# Patient Record
Sex: MALE | Race: WHITE | NOT HISPANIC OR LATINO | Employment: FULL TIME | URBAN - METROPOLITAN AREA
[De-identification: names, ages, dates, MRNs, and addresses within clinical notes are randomized per-mention and may not be internally consistent; named-entity substitution may affect disease eponyms.]

---

## 2024-03-15 ENCOUNTER — APPOINTMENT (OUTPATIENT)
Dept: CT IMAGING | Facility: CLINIC | Age: 64
End: 2024-03-15
Attending: FAMILY MEDICINE
Payer: COMMERCIAL

## 2024-03-15 ENCOUNTER — HOSPITAL ENCOUNTER (EMERGENCY)
Facility: CLINIC | Age: 64
Discharge: HOME | End: 2024-03-15
Attending: FAMILY MEDICINE | Admitting: FAMILY MEDICINE
Payer: COMMERCIAL

## 2024-03-15 ENCOUNTER — APPOINTMENT (OUTPATIENT)
Dept: MRI IMAGING | Facility: CLINIC | Age: 64
End: 2024-03-15
Payer: COMMERCIAL

## 2024-03-15 ENCOUNTER — APPOINTMENT (OUTPATIENT)
Dept: MRI IMAGING | Facility: CLINIC | Age: 64
End: 2024-03-15
Attending: FAMILY MEDICINE
Payer: COMMERCIAL

## 2024-03-15 VITALS
OXYGEN SATURATION: 99 % | DIASTOLIC BLOOD PRESSURE: 84 MMHG | WEIGHT: 162.8 LBS | TEMPERATURE: 99.1 F | HEIGHT: 69 IN | BODY MASS INDEX: 24.11 KG/M2 | RESPIRATION RATE: 15 BRPM | SYSTOLIC BLOOD PRESSURE: 138 MMHG | HEART RATE: 66 BPM

## 2024-03-15 DIAGNOSIS — R20.0 LEFT FACIAL NUMBNESS: ICD-10-CM

## 2024-03-15 LAB
ANION GAP SERPL CALCULATED.3IONS-SCNC: 11 MMOL/L (ref 7–15)
APTT PPP: 30 SECONDS (ref 22–38)
BASOPHILS # BLD AUTO: 0 10E3/UL (ref 0–0.2)
BASOPHILS NFR BLD AUTO: 0 %
BUN SERPL-MCNC: 19.3 MG/DL (ref 8–23)
CALCIUM SERPL-MCNC: 9.8 MG/DL (ref 8.8–10.2)
CHLORIDE SERPL-SCNC: 101 MMOL/L (ref 98–107)
CREAT SERPL-MCNC: 1.2 MG/DL (ref 0.67–1.17)
DEPRECATED HCO3 PLAS-SCNC: 28 MMOL/L (ref 22–29)
EGFRCR SERPLBLD CKD-EPI 2021: 69 ML/MIN/1.73M2
EOSINOPHIL # BLD AUTO: 0.2 10E3/UL (ref 0–0.7)
EOSINOPHIL NFR BLD AUTO: 4 %
ERYTHROCYTE [DISTWIDTH] IN BLOOD BY AUTOMATED COUNT: 11.9 % (ref 10–15)
GLUCOSE BLDC GLUCOMTR-MCNC: 90 MG/DL (ref 70–99)
GLUCOSE SERPL-MCNC: 93 MG/DL (ref 70–99)
HCT VFR BLD AUTO: 42.6 % (ref 40–53)
HGB BLD-MCNC: 14.6 G/DL (ref 13.3–17.7)
IMM GRANULOCYTES # BLD: 0 10E3/UL
IMM GRANULOCYTES NFR BLD: 0 %
INR PPP: 1.02 (ref 0.85–1.15)
LYMPHOCYTES # BLD AUTO: 1.2 10E3/UL (ref 0.8–5.3)
LYMPHOCYTES NFR BLD AUTO: 26 %
MCH RBC QN AUTO: 32.8 PG (ref 26.5–33)
MCHC RBC AUTO-ENTMCNC: 34.3 G/DL (ref 31.5–36.5)
MCV RBC AUTO: 96 FL (ref 78–100)
MONOCYTES # BLD AUTO: 0.4 10E3/UL (ref 0–1.3)
MONOCYTES NFR BLD AUTO: 10 %
NEUTROPHILS # BLD AUTO: 2.8 10E3/UL (ref 1.6–8.3)
NEUTROPHILS NFR BLD AUTO: 60 %
NRBC # BLD AUTO: 0 10E3/UL
NRBC BLD AUTO-RTO: 0 /100
PLATELET # BLD AUTO: 194 10E3/UL (ref 150–450)
POTASSIUM SERPL-SCNC: 4.1 MMOL/L (ref 3.4–5.3)
RBC # BLD AUTO: 4.45 10E6/UL (ref 4.4–5.9)
SODIUM SERPL-SCNC: 140 MMOL/L (ref 135–145)
TROPONIN T SERPL HS-MCNC: 7 NG/L
WBC # BLD AUTO: 4.6 10E3/UL (ref 4–11)

## 2024-03-15 PROCEDURE — 70450 CT HEAD/BRAIN W/O DYE: CPT

## 2024-03-15 PROCEDURE — 255N000002 HC RX 255 OP 636: Performed by: FAMILY MEDICINE

## 2024-03-15 PROCEDURE — 99207 PR NO BILLABLE SERVICE THIS VISIT: CPT

## 2024-03-15 PROCEDURE — 85730 THROMBOPLASTIN TIME PARTIAL: CPT | Performed by: FAMILY MEDICINE

## 2024-03-15 PROCEDURE — 70549 MR ANGIOGRAPH NECK W/O&W/DYE: CPT

## 2024-03-15 PROCEDURE — 85004 AUTOMATED DIFF WBC COUNT: CPT | Performed by: FAMILY MEDICINE

## 2024-03-15 PROCEDURE — 99285 EMERGENCY DEPT VISIT HI MDM: CPT | Mod: 25 | Performed by: FAMILY MEDICINE

## 2024-03-15 PROCEDURE — 70553 MRI BRAIN STEM W/O & W/DYE: CPT

## 2024-03-15 PROCEDURE — A9585 GADOBUTROL INJECTION: HCPCS | Performed by: FAMILY MEDICINE

## 2024-03-15 PROCEDURE — 82962 GLUCOSE BLOOD TEST: CPT

## 2024-03-15 PROCEDURE — 84484 ASSAY OF TROPONIN QUANT: CPT | Performed by: FAMILY MEDICINE

## 2024-03-15 PROCEDURE — 70544 MR ANGIOGRAPHY HEAD W/O DYE: CPT | Mod: XU

## 2024-03-15 PROCEDURE — 85610 PROTHROMBIN TIME: CPT | Performed by: FAMILY MEDICINE

## 2024-03-15 PROCEDURE — 250N000013 HC RX MED GY IP 250 OP 250 PS 637: Performed by: FAMILY MEDICINE

## 2024-03-15 PROCEDURE — 82565 ASSAY OF CREATININE: CPT | Performed by: FAMILY MEDICINE

## 2024-03-15 PROCEDURE — 93010 ELECTROCARDIOGRAM REPORT: CPT | Performed by: FAMILY MEDICINE

## 2024-03-15 PROCEDURE — 93005 ELECTROCARDIOGRAM TRACING: CPT | Performed by: FAMILY MEDICINE

## 2024-03-15 PROCEDURE — 36415 COLL VENOUS BLD VENIPUNCTURE: CPT | Performed by: FAMILY MEDICINE

## 2024-03-15 RX ORDER — ASPIRIN 81 MG/1
81 TABLET ORAL DAILY
Qty: 30 TABLET | Refills: 0 | Status: SHIPPED | OUTPATIENT
Start: 2024-03-15 | End: 2024-04-14

## 2024-03-15 RX ORDER — ROSUVASTATIN CALCIUM 10 MG/1
10 TABLET, COATED ORAL DAILY
COMMUNITY
Start: 2024-02-28

## 2024-03-15 RX ORDER — GADOBUTROL 604.72 MG/ML
7.5 INJECTION INTRAVENOUS ONCE
Status: COMPLETED | OUTPATIENT
Start: 2024-03-15 | End: 2024-03-15

## 2024-03-15 RX ORDER — LANOLIN ALCOHOL/MO/W.PET/CERES
1000 CREAM (GRAM) TOPICAL DAILY
COMMUNITY

## 2024-03-15 RX ORDER — ASPIRIN 81 MG/1
325 TABLET, CHEWABLE ORAL ONCE
Status: COMPLETED | OUTPATIENT
Start: 2024-03-15 | End: 2024-03-15

## 2024-03-15 RX ADMIN — GADOBUTROL 7.5 ML: 604.72 INJECTION INTRAVENOUS at 18:35

## 2024-03-15 RX ADMIN — ASPIRIN 81 MG CHEWABLE TABLET 324 MG: 81 TABLET CHEWABLE at 20:36

## 2024-03-15 ASSESSMENT — ACTIVITIES OF DAILY LIVING (ADL)
ADLS_ACUITY_SCORE: 35

## 2024-03-15 ASSESSMENT — COLUMBIA-SUICIDE SEVERITY RATING SCALE - C-SSRS
6. HAVE YOU EVER DONE ANYTHING, STARTED TO DO ANYTHING, OR PREPARED TO DO ANYTHING TO END YOUR LIFE?: NO
1. IN THE PAST MONTH, HAVE YOU WISHED YOU WERE DEAD OR WISHED YOU COULD GO TO SLEEP AND NOT WAKE UP?: NO
2. HAVE YOU ACTUALLY HAD ANY THOUGHTS OF KILLING YOURSELF IN THE PAST MONTH?: NO

## 2024-03-15 NOTE — ED TRIAGE NOTES
States at 1130 today had left face numbness, left arm fell asleep, and left eye vision was off.      Triage Assessment (Adult)       Row Name 03/15/24 1510          Triage Assessment    Airway WDL WDL        Respiratory WDL    Respiratory WDL WDL        Skin Circulation/Temperature WDL    Skin Circulation/Temperature WDL WDL        Cardiac WDL    Cardiac WDL WDL        Cognitive/Neuro/Behavioral WDL    Cognitive/Neuro/Behavioral WDL WDL                     
No

## 2024-03-15 NOTE — ED PROVIDER NOTES
[facility] ED Provider Note   Patient: Ousmane Wheeler  MRN #:  [id number]  Date of Visit: March 15, 2024    CC:     Chief Complaint   Patient presents with    Numbness     HPI:  Ousmane Wheeler is a 63 year old male history of hyperlipidemia who presented to the emergency department with transient symptoms of left facial numbness, and left upper arm numbness that occurred at 1130 this morning.  Patient was at work when symptoms came on.  He states he was chewing on a carrot when he suddenly felt the left-sided facial numbness.  He did not have any facial droop, speech difficulty, or double swallowing.  This was preceded by some vague left eye symptoms but not loss of vision or double vision.  Patient at the same time also had some left upper arm numbness like it had fallen asleep.  His symptoms lasted 5 minutes and then resolved.  He was at work and did not come in sooner.  He went to urgent care after work and then was directed to come to the ED.  Patient has no current symptoms, although he reported that earlier this week after he went for a run, he had brief episode of left leg numbness.  Patient has never had anything like this before.  He does not have headache, vision loss, facial droop, speech difficulty, dysphagia, chest pain, shortness of breath, nausea, vomiting, or unilateral weakness.  He is right-hand dominant.  He has had high cholesterol levels for the past 2 years, recently started on Lipitor but developed some pain in his knuckles and discontinued medications.  No personal history of diabetes, hypertension or tobacco use.  No previous strokes or heart attacks.  Patient only takes vitamin D and B complex vitamins.    Problem List:  There are no problems to display for this patient.      No past medical history on file.    MEDS: No current outpatient medications on file.      ALLERGIES:  No Known Allergies    No past surgical history  "on file.    Social History     Tobacco Use    Smoking status: Never         Review of Systems   Except as noted in HPI, all other systems were reviewed and are negative    Physical Exam   Vitals were reviewed  Patient Vitals for the past 12 hrs:   BP Temp Temp src Pulse Resp SpO2 Height Weight   03/15/24 1703 129/79 -- -- 60 15 97 % -- --   03/15/24 1648 -- -- -- 60 10 97 % -- --   03/15/24 1633 -- -- -- 60 -- 98 % -- --   03/15/24 1632 -- -- -- 60 13 97 % -- --   03/15/24 1618 -- -- -- 64 -- 99 % -- --   03/15/24 1617 -- -- -- 62 -- 99 % -- --   03/15/24 1603 -- -- -- 65 -- 98 % -- --   03/15/24 1602 (!) 155/116 -- -- 66 -- 98 % -- --   03/15/24 1548 -- -- -- 63 (!) 9 98 % -- --   03/15/24 1547 (!) 150/82 -- -- 63 -- 97 % -- --   03/15/24 1533 -- -- -- 68 16 98 % -- --   03/15/24 1532 (!) 159/95 -- -- 69 16 98 % -- --   03/15/24 1517 (!) 179/108 -- -- -- -- -- -- --   03/15/24 1511 (!) 179/108 99.1  F (37.3  C) Oral 73 20 99 % 1.753 m (5' 9\") 73.8 kg (162 lb 12.8 oz)     GENERAL APPEARANCE: Alert and oriented x 3, no acute distress  FACE: normal facies: No asymmetry  EYES: Pupils are equal and reactive to light, extraocular muscles are intact  HENT: normal external exam; tongue protrudes in the midline  NECK: no adenopathy or asymmetry; supple, no carotid bruits  RESP: normal respiratory effort; clear breath sounds bilaterally  CV: regular rate and rhythm; no significant murmurs, gallops or rubs  ABD: soft, no tenderness; no rebound or guarding; bowel sounds are normal  MS: no gross deformities noted; normal muscle tone.  EXT: No calf tenderness or pitting edema  SKIN: no worrisome rash  NEURO: no facial droop; no focal deficits, speech is normal; strength is intact in the upper and lower extremities.  Normal sensation to touch in both upper and lower extremities.  PSYCH: normal mood and affect    [employer] Stroke Scale  Exam Interval: Baseline   Score    Level of consciousness: (0)   Alert, " keenly responsive    LOC questions: (0)   Answers both questions correctly    LOC commands: (0)   Performs both tasks correctly    Best gaze: (0)   Normal    Visual: (0)   No visual loss    Facial palsy: (0)   Normal symmetrical movements    Motor arm (left): (0)   No drift    Motor arm (right): (0)   No drift    Motor leg (left): (0)   No drift    Motor leg (right): (0)   No drift    Limb ataxia: (0)   Absent    Sensory: (0)   Normal- no sensory loss    Best language: (0)   Normal- no aphasia    Dysarthria: (0)   Normal    Extinction and inattention: (0)   No abnormality        Total Score:  0         Available Lab/Imaging Results     Results for orders placed or performed during the hospital encounter of 03/15/24 (from the past 24 hour(s))   Glucose by meter   Result Value Ref Range    GLUCOSE BY METER POCT 90 70 - 99 mg/dL   CBC with Platelets & Differential    Narrative    The following orders were created for panel order CBC with Platelets & Differential.  Procedure                               Abnormality         Status                     ---------                               -----------         ------                     CBC with platelets and d...[[id number]]                      Final result                 Please view results for these tests on the individual orders.   Basic metabolic panel   Result Value Ref Range    Sodium 140 135 - 145 mmol/L    Potassium 4.1 3.4 - 5.3 mmol/L    Chloride 101 98 - 107 mmol/L    Carbon Dioxide (CO2) 28 22 - 29 mmol/L    Anion Gap 11 7 - 15 mmol/L    Urea Nitrogen 19.3 8.0 - 23.0 mg/dL    Creatinine 1.20 (H) 0.67 - 1.17 mg/dL    GFR Estimate 69 >60 mL/min/1.73m2    Calcium 9.8 8.8 - 10.2 mg/dL    Glucose 93 70 - 99 mg/dL   INR   Result Value Ref Range    INR 1.02 0.85 - 1.15   Partial thromboplastin time   Result Value Ref Range    aPTT 30 22 - 38 Seconds   Troponin T, High Sensitivity   Result Value Ref Range    Troponin T, High Sensitivity 7 <=22 ng/L   CBC with  platelets and differential   Result Value Ref Range    WBC Count 4.6 4.0 - 11.0 10e3/uL    RBC Count 4.45 4.40 - 5.90 10e6/uL    Hemoglobin 14.6 13.3 - 17.7 g/dL    Hematocrit 42.6 40.0 - 53.0 %    MCV 96 78 - 100 fL    MCH 32.8 26.5 - 33.0 pg    MCHC 34.3 31.5 - 36.5 g/dL    RDW 11.9 10.0 - 15.0 %    Platelet Count 194 150 - 450 10e3/uL    % Neutrophils 60 %    % Lymphocytes 26 %    % Monocytes 10 %    % Eosinophils 4 %    % Basophils 0 %    % Immature Granulocytes 0 %    NRBCs per 100 WBC 0 <1 /100    Absolute Neutrophils 2.8 1.6 - 8.3 10e3/uL    Absolute Lymphocytes 1.2 0.8 - 5.3 10e3/uL    Absolute Monocytes 0.4 0.0 - 1.3 10e3/uL    Absolute Eosinophils 0.2 0.0 - 0.7 10e3/uL    Absolute Basophils 0.0 0.0 - 0.2 10e3/uL    Absolute Immature Granulocytes 0.0 <=0.4 10e3/uL    Absolute NRBCs 0.0 10e3/uL   CT Head w/o Contrast    Narrative    CT SCAN OF THE HEAD WITHOUT CONTRAST   3/15/2024 3:26 PM     HISTORY: Acute neuro deficit, stroke/TIA suspected    TECHNIQUE:  Axial images of the head and coronal reformations without  IV contrast material. Radiation dose for this scan was reduced using  automated exposure control, adjustment of the mA and/or kV according  to patient size, or iterative reconstruction technique.    COMPARISON: Head CT 3/25/2016    FINDINGS:  No evidence of hemorrhage, mass, or hydrocephalus. Mild generalized  volume loss with background of nonspecific white matter  hypoattenuation likely representing chronic small vessel ischemic  change. No acute osseous abnormality.      Impression    IMPRESSION:   No acute intracranial abnormality.    LETI OSBORN MD         SYSTEM ID:  [id number]     EKG reviewed by me: Sinus rhythm with heart rate of 68.  IL interval of 183 ms, QT interval of 440 ms, cures duration of 162 ms.  Right bundle branch block.  No acute ST-T wave changes.  No previous EKGs for comparison.      Impression     Final diagnoses:   Left facial numbness         ED Course & Medical  Decision Making   Ousmane Wheeler is a 63 year old male with history of hyperlipidemia who presented to the emergency department with a brief episode of left facial and left upper arm numbness that started at 11:30 AM.  Prior to this patient reported some vague left eye symptoms, with it not for feeling normal.  He had no vision loss or double vision.  Patient had an episode of left leg numbness earlier this week after he went for a run.  Patient states that he was at work when his left facial numbness and arm numbness started.  He went to urgent care initially, and then was sent to the ED.  His episode occurred about 3-1/2 hours ago.  Patient reports no current symptoms.  He denies any personal history of diabetes, hypertension, or tobacco use.  He had taken Lipitor briefly but because of side effects and concerns for other side effects of other cholesterol-lowering medications discontinued these medications.    Patient was seen shortly after arrival.  An urgent code stroke was called.  His formal NIH score is 0.  Patient was sent for initial noncontrast CT scan of the head.    3:25 PM: I talked with the stroke neurology team.  Given that his symptoms have resolved and he has an NIH score of 0, proceed with MR imaging of brain and neck.    Patient is awaiting completion of his MR brain with and without contrast.      7:00 PM: Patient was signed out to Dr. Dash Stearns at the change of shift.         Disclaimer: This note consists of words and symbols derived from keyboarding and dictation using voice recognition software.  As a result, there may be errors that have gone undetected.  Please consider this when interpreting information found in this note.       Robert Gillette MD  03/15/24 2029

## 2024-03-15 NOTE — CONSULTS
"  [facility]    Stroke Telephone Note    I was called by Dr. Robert Gillette on 03/15/24 regarding patient Ousmane Wheeler. The patient is a 63 year old male PMH of hairy cell leukemia, megaloblastic anemia, ?HTN. History obtained from ED provider. Today, at 11:30 AM Ousmane developed left facial numbness while biting down/chewing on a carrot. He also developed left upper arm numbness. These symptoms lasted 5-10 minutes and then resolved. Ousmane also reported an episode of left leg numbness, earlier this week, after going on a run. On ED provider's examination, no focal neurologic deficits appreciated, NIHSS 0.      Stroke code/urgent consult was called and then changed to a STAT consult.     Vitals  BP: (!) 179/108   Pulse: 73   Resp: 20   Temp: 99.1  F (37.3  C)   Weight: 73.8 kg (162 lb 12.8 oz) (actual)    Imaging Findings  CTH (obtained my primary team): No evidence of hemorrhage.  MRI Brain with and without contrast: pending   MRA Brain without contrast: pending  MRA Neck with and without contrast: pending     Impression  Transient episode of left facial numbness and left upper arm numbness, unclear etiology. Will obtain MRI brain and MRAs to evaluate for acute stroke.     Recommendations  - brain MRI with and without contrast; please page stroke neurology if infarct is identified for further recommendations  - MRA Brain without contrast and MRA Neck with and without contrast (ordered)    Case discussed with vascular neurology attending Dr. Ryder Slaughter.    My recommendations are based on the information provided over the phone by Ousmane Pattonric's in-person providers. They are not intended to replace the clinical judgment of his in-person providers. I was not requested to personally see or examine the patient at this time.     Cori Pryor PA-C  Vascular Neurology    To page me or covering stroke neurology team member, click here: [facility]  Choose \"On Call\" tab at top, " "then select \"NEUROLOGY/ALL SITES\" from middle drop-down box, press Enter, then look for \"stroke\" or \"telestroke\" for your site.   "

## 2024-03-15 NOTE — ED NOTES
Bed: ED01  Expected date:   Expected time:   Means of arrival:   Comments:  Bed 6   Estimated Blood Loss (Cc): minimal

## 2024-03-16 NOTE — DISCHARGE INSTRUCTIONS
1.  Please follow-up with your doctor this coming week to have a tulio discussion about getting on some type of cholesterol medication and discussing risk benefits about this.  2.  Someone should be contacting you on Monday to get an appointment set up for neurology.  3.  Start on a baby aspirin daily starting tomorrow.

## 2024-03-16 NOTE — MEDICATION SCRIBE - ADMISSION MEDICATION HISTORY
Medication Scribe Admission Medication History    Admission medication history is complete. The information provided in this note is only as accurate as the sources available at the time of the update.    Information Source(s): Patient via in-person    Pertinent Information:     Changes made to PTA medication list:  Added: PTA medication list added from reconcile list, confirmed by patient   Deleted: None  Changed: None    Allergies reviewed with patient and updates made in EHR: yes    Medication History Completed By: RORY SANON 3/15/2024 7:21 PM    PTA Med List   Medication Sig Last Dose    cholecalciferol (VITAMIN D3) 25 mcg (1000 units) capsule Take 25 mcg by mouth daily 1,000 iul 3/15/2024 at am    cyanocobalamin (VITAMIN B-12) 1000 MCG tablet Take 1,000 mcg by mouth daily 3/15/2024 at am

## 2024-03-16 NOTE — ED PROVIDER NOTES
Transfer of Care Note  This patient was signed out to me and I assumed care from Dr. Gillette at change of shift.  Ousmane Wheeler is a 63 year old male who presented to the emergency department with a chief complaint of Numbness  .  Please see the original providers history and physical for complete details.    The following issues were signed out to me to follow up on:  Patient was signed out to me to follow-up on the MRI.  Briefly I was told that this is a 63-year-old male who came in after a 5 to 10-minute episode of left facial and left arm numbness that lasted about 10 minutes.  Patient was asymptomatic since being here.   CT scan of the head was done and was negative.  Stroke neuro was consulted and they recommended getting a MRI of the brain and also an MRI of the head and neck vessels.      Pertant Lab/Imaging Findings During this Visit was     Results for orders placed or performed during the hospital encounter of 03/15/24 (from the past 24 hour(s))   Glucose by meter   Result Value Ref Range    GLUCOSE BY METER POCT 90 70 - 99 mg/dL   CBC with Platelets & Differential    Narrative    The following orders were created for panel order CBC with Platelets & Differential.  Procedure                               Abnormality         Status                     ---------                               -----------         ------                     CBC with platelets and d...[[id number]]                      Final result                 Please view results for these tests on the individual orders.   Basic metabolic panel   Result Value Ref Range    Sodium 140 135 - 145 mmol/L    Potassium 4.1 3.4 - 5.3 mmol/L    Chloride 101 98 - 107 mmol/L    Carbon Dioxide (CO2) 28 22 - 29 mmol/L    Anion Gap 11 7 - 15 mmol/L    Urea Nitrogen 19.3 8.0 - 23.0 mg/dL    Creatinine 1.20 (H) 0.67 - 1.17 mg/dL    GFR Estimate 69 >60 mL/min/1.73m2    Calcium 9.8 8.8 - 10.2 mg/dL    Glucose 93 70 - 99 mg/dL   INR   Result Value Ref Range     INR 1.02 0.85 - 1.15   Partial thromboplastin time   Result Value Ref Range    aPTT 30 22 - 38 Seconds   Troponin T, High Sensitivity   Result Value Ref Range    Troponin T, High Sensitivity 7 <=22 ng/L   CBC with platelets and differential   Result Value Ref Range    WBC Count 4.6 4.0 - 11.0 10e3/uL    RBC Count 4.45 4.40 - 5.90 10e6/uL    Hemoglobin 14.6 13.3 - 17.7 g/dL    Hematocrit 42.6 40.0 - 53.0 %    MCV 96 78 - 100 fL    MCH 32.8 26.5 - 33.0 pg    MCHC 34.3 31.5 - 36.5 g/dL    RDW 11.9 10.0 - 15.0 %    Platelet Count 194 150 - 450 10e3/uL    % Neutrophils 60 %    % Lymphocytes 26 %    % Monocytes 10 %    % Eosinophils 4 %    % Basophils 0 %    % Immature Granulocytes 0 %    NRBCs per 100 WBC 0 <1 /100    Absolute Neutrophils 2.8 1.6 - 8.3 10e3/uL    Absolute Lymphocytes 1.2 0.8 - 5.3 10e3/uL    Absolute Monocytes 0.4 0.0 - 1.3 10e3/uL    Absolute Eosinophils 0.2 0.0 - 0.7 10e3/uL    Absolute Basophils 0.0 0.0 - 0.2 10e3/uL    Absolute Immature Granulocytes 0.0 <=0.4 10e3/uL    Absolute NRBCs 0.0 10e3/uL   CT Head w/o Contrast    Narrative    CT SCAN OF THE HEAD WITHOUT CONTRAST   3/15/2024 3:26 PM     HISTORY: Acute neuro deficit, stroke/TIA suspected    TECHNIQUE:  Axial images of the head and coronal reformations without  IV contrast material. Radiation dose for this scan was reduced using  automated exposure control, adjustment of the mA and/or kV according  to patient size, or iterative reconstruction technique.    COMPARISON: Head CT 3/25/2016    FINDINGS:  No evidence of hemorrhage, mass, or hydrocephalus. Mild generalized  volume loss with background of nonspecific white matter  hypoattenuation likely representing chronic small vessel ischemic  change. No acute osseous abnormality.      Impression    IMPRESSION:   No acute intracranial abnormality.    LETI OSBORN MD         SYSTEM ID:  [id number]   MR Brain w/o & w Contrast    Narrative    EXAM: MRA BRAIN ([place] OF STONE) W/O CONTRAST, MRA NECK  (CAROTIDS) W/O and W CONTRAST, MR BRAIN W/O and W CONTRAST  LOCATION: [facility]  DATE: 3/15/2024    INDICATION: Transient left face and upper arm numbness.  COMPARISON: MRI dated 12/27/2021. No prior angiography is available for comparison.  CONTRAST: 7.5mL Gadavist  TECHNIQUE:   1) Routine multiplanar multisequence head MRI without and with intravenous contrast.  2) 3D time-of-flight head MRA without intravenous contrast.  3) Neck MRA without and with IV contrast. Stenosis measurements made according to NASCET criteria unless otherwise specified.    FINDINGS:  HEAD MRI:  INTRACRANIAL CONTENTS: No acute/subacute infarct, acute hemorrhage, or extra-axial fluid collection. No intracranial mass or abnormal enhancement. Several tiny foci of T2 prolongation within the bilateral cerebral white matter, nonspecific although most   likely the sequela of minimal chronic microvascular ischemia. Normal ventricles and sulci for age. Normal position of the cerebellar tonsils.     SELLA: Within technique limitations, no gross abnormality.    OSSEOUS STRUCTURES/SOFT TISSUES: Normal marrow signal. The major intracranial vascular flow voids are maintained.     ORBITS: Within technique limitations, no significant abnormality.     SINUSES/MASTOIDS: No significant paranasal sinus or mastoid mucosal disease.      HEAD MRA:   ANTERIOR CIRCULATION: No high-grade stenosis, occlusion, aneurysm, or high flow vascular malformation. Fetal continuation of the left posterior cerebral artery.    POSTERIOR CIRCULATION: Moderate short-segment stenosis involving the P2 segment of the right posterior cerebral artery. Otherwise, no high-grade stenosis, occlusion, aneurysm, or high flow vascular malformation. Balanced vertebral arteries.     NECK MRA:   RIGHT CAROTID: No hemodynamically-significant stenosis or dissection.     LEFT CAROTID: No hemodynamically-significant stenosis or dissection. Mild irregularity at the  carotid bifurcation and ICA origin/proximal segment, most compatible with nonflow-limiting atherosclerotic plaque.    VERTEBRAL ARTERIES: No focal high-grade stenosis or dissection. Balanced vertebral arteries.    AORTIC ARCH: Classic aortic arch anatomy with no significant stenosis at the origin of the great vessels.      Impression    IMPRESSION:  HEAD MRI:   1.  No acute intracranial abnormality.    HEAD MRA:   1.  Moderate short-segment stenosis involving the P2 segment of the right PCA.  2.  No large vessel occlusion, high-grade anterior circulation stenosis, aneurysm, or high-flow vascular malformation.    NECK MRA:  1.  Unremarkable neck MRA.   MRA Neck (Carotids) wo & w Contrast    Narrative    EXAM: MRA BRAIN ([place] OF STONE) W/O CONTRAST, MRA NECK (CAROTIDS) W/O and W CONTRAST, MR BRAIN W/O and W CONTRAST  LOCATION: [facility]  DATE: 3/15/2024    INDICATION: Transient left face and upper arm numbness.  COMPARISON: MRI dated 12/27/2021. No prior angiography is available for comparison.  CONTRAST: 7.5mL Gadavist  TECHNIQUE:   1) Routine multiplanar multisequence head MRI without and with intravenous contrast.  2) 3D time-of-flight head MRA without intravenous contrast.  3) Neck MRA without and with IV contrast. Stenosis measurements made according to NASCET criteria unless otherwise specified.    FINDINGS:  HEAD MRI:  INTRACRANIAL CONTENTS: No acute/subacute infarct, acute hemorrhage, or extra-axial fluid collection. No intracranial mass or abnormal enhancement. Several tiny foci of T2 prolongation within the bilateral cerebral white matter, nonspecific although most   likely the sequela of minimal chronic microvascular ischemia. Normal ventricles and sulci for age. Normal position of the cerebellar tonsils.     SELLA: Within technique limitations, no gross abnormality.    OSSEOUS STRUCTURES/SOFT TISSUES: Normal marrow signal. The major intracranial vascular flow voids are  maintained.     ORBITS: Within technique limitations, no significant abnormality.     SINUSES/MASTOIDS: No significant paranasal sinus or mastoid mucosal disease.      HEAD MRA:   ANTERIOR CIRCULATION: No high-grade stenosis, occlusion, aneurysm, or high flow vascular malformation. Fetal continuation of the left posterior cerebral artery.    POSTERIOR CIRCULATION: Moderate short-segment stenosis involving the P2 segment of the right posterior cerebral artery. Otherwise, no high-grade stenosis, occlusion, aneurysm, or high flow vascular malformation. Balanced vertebral arteries.     NECK MRA:   RIGHT CAROTID: No hemodynamically-significant stenosis or dissection.     LEFT CAROTID: No hemodynamically-significant stenosis or dissection. Mild irregularity at the carotid bifurcation and ICA origin/proximal segment, most compatible with nonflow-limiting atherosclerotic plaque.    VERTEBRAL ARTERIES: No focal high-grade stenosis or dissection. Balanced vertebral arteries.    AORTIC ARCH: Classic aortic arch anatomy with no significant stenosis at the origin of the great vessels.      Impression    IMPRESSION:  HEAD MRI:   1.  No acute intracranial abnormality.    HEAD MRA:   1.  Moderate short-segment stenosis involving the P2 segment of the right PCA.  2.  No large vessel occlusion, high-grade anterior circulation stenosis, aneurysm, or high-flow vascular malformation.    NECK MRA:  1.  Unremarkable neck MRA.   MRA Brain ([place]) wo Contrast    Narrative    EXAM: MRA BRAIN ([place] OF [place]) W/O CONTRAST, MRA NECK (CAROTIDS) W/O and W CONTRAST, MR BRAIN W/O and W CONTRAST  LOCATION: [facility]  DATE: 3/15/2024    INDICATION: Transient left face and upper arm numbness.  COMPARISON: MRI dated 12/27/2021. No prior angiography is available for comparison.  CONTRAST: 7.5mL Gadavist  TECHNIQUE:   1) Routine multiplanar multisequence head MRI without and with intravenous contrast.  2) 3D  time-of-flight head MRA without intravenous contrast.  3) Neck MRA without and with IV contrast. Stenosis measurements made according to NASCET criteria unless otherwise specified.    FINDINGS:  HEAD MRI:  INTRACRANIAL CONTENTS: No acute/subacute infarct, acute hemorrhage, or extra-axial fluid collection. No intracranial mass or abnormal enhancement. Several tiny foci of T2 prolongation within the bilateral cerebral white matter, nonspecific although most   likely the sequela of minimal chronic microvascular ischemia. Normal ventricles and sulci for age. Normal position of the cerebellar tonsils.     SELLA: Within technique limitations, no gross abnormality.    OSSEOUS STRUCTURES/SOFT TISSUES: Normal marrow signal. The major intracranial vascular flow voids are maintained.     ORBITS: Within technique limitations, no significant abnormality.     SINUSES/MASTOIDS: No significant paranasal sinus or mastoid mucosal disease.      HEAD MRA:   ANTERIOR CIRCULATION: No high-grade stenosis, occlusion, aneurysm, or high flow vascular malformation. Fetal continuation of the left posterior cerebral artery.    POSTERIOR CIRCULATION: Moderate short-segment stenosis involving the P2 segment of the right posterior cerebral artery. Otherwise, no high-grade stenosis, occlusion, aneurysm, or high flow vascular malformation. Balanced vertebral arteries.     NECK MRA:   RIGHT CAROTID: No hemodynamically-significant stenosis or dissection.     LEFT CAROTID: No hemodynamically-significant stenosis or dissection. Mild irregularity at the carotid bifurcation and ICA origin/proximal segment, most compatible with nonflow-limiting atherosclerotic plaque.    VERTEBRAL ARTERIES: No focal high-grade stenosis or dissection. Balanced vertebral arteries.    AORTIC ARCH: Classic aortic arch anatomy with no significant stenosis at the origin of the great vessels.      Impression    IMPRESSION:  HEAD MRI:   1.  No acute intracranial  abnormality.    HEAD MRA:   1.  Moderate short-segment stenosis involving the P2 segment of the right PCA.  2.  No large vessel occlusion, high-grade anterior circulation stenosis, aneurysm, or high-flow vascular malformation.    NECK MRA:  1.  Unremarkable neck MRA.         My focused follow up physical exam shows:   Vitals:  B/P: 129/79, T: 99.1, P: 60, R: 15  Gen:  Pt appears stable and no apparent distress      ED Course & Medical Decision Making:  MRI came back and did not show any acute abnormality.  MRI did show a moderate short segment stenosis of the P2 segment of the right PCA, no other vascular findings or occlusions were noted.  I will go ahead and consult stroke neurology to see if there is any recommendations they have with the stenosis as this could be in the area of the patient's symptoms.  I did speak with stroke neurology and they are recommending discharge home.  They recommend starting the patient on a baby aspirin a day.  Patient was given a load here in the emergency department.  They also recommend increasing or changing the patient's Crestor.  When I went in to talk to the patient, he states that he is not taking the Crestor, he stopped it 2 weeks ago.  His doctor tried him on another medication but he did not want to take it because he did not like the side effect profile.  Patient is very concerned about taking anything for his cholesterol and just wants to do diet and exercise.  I had a long discussion with him about risk factors and trying to minimize these especially with seeing the stenosis in one of his cerebral segments.  Patient still would like to hold off on any cholesterol medicine.  I recommend that he get into see his doctor this coming week and have a tulio discussion about risk-benefit and side effect profiles and trying to find something that he can agree to take to help with his cholesterol.  He is agreeing to do the baby aspirin for now.  Neurology is also recommending a  referral to general neurology which was placed.  Patient will be discharged at this time.         Impression and Disposition:  Numbness           This note was completed in part using Dragon voice recognition, and may contain word and grammatical errors.        Dash Burr MD  03/15/24 2040

## 2024-03-16 NOTE — CONSULTS
"  [facility]    Stroke Telephone Note    I was called by Dash Burr on 03/15/24 regarding patient Ousmane Wheeler. The patient is a 63 year old male with past medical history of hairy cell leukemia, hyperlipidemia came in bed 5 with episode of left face numbness and left upper arm numbness while chewing on it..  MRI brain did not show any acute stroke.  MRA head and neck showed right P2 PCA moderate stenosis.  Given that the stenosis is moderate and distal low suspicion that this is a sensation of symptoms that occurred today.  Presentation is unlikely to be a TIA.  However given the fact that he has intracranial stenosis, risk factor optimization can help in stroke prevention    Plan  Start on aspirin 325 mg daily  Repeat lipid profile and HbA1c .  Adjust statin dose based on lipid profile  Follow-up in outpatient neurology clinic  - Statin for goal LDL 40-70  - Long term BP goal <135/80 to be achieved as outpatient within several weeks  - Better control of DM2 needed, goal A1C <7.0  - Mediterranean diet and daily exercise    The Stroke Staff is Dr. Slaughter.    Helena Carvajal MD  Vascular Neurology Fellow    To page me or covering stroke neurology team member, click here: [facility]  Choose \"On Call\" tab at top, then select \"NEUROLOGY/ALL SITES\" from middle drop-down box, press Enter, then look for \"stroke\" or \"telestroke\" for your site.        "

## 2024-05-02 ENCOUNTER — TELEPHONE (OUTPATIENT)
Dept: NEUROLOGY | Facility: CLINIC | Age: 64
End: 2024-05-02
Payer: COMMERCIAL

## 2024-05-02 NOTE — TELEPHONE ENCOUNTER
Left message with appointment time and date as well as well as phone number if appt needs to be changed

## 2024-05-02 NOTE — TELEPHONE ENCOUNTER
NEUROLOGY PRE- VISIT      RECORDS RECEIVED FROM: Dash Burr MD   REASON FOR VISIT: Left facial numbness   PROVIDER: Angel   DATE OF APPT: 05/03/2024   NOTES (FOR ALL VISITS) STATUS DETAILS   OFFICE NOTE from referring provider Internal Referral in chart   OFFICE NOTE from other specialist N/A    DISCHARGE SUMMARY from hospital N/A    DISCHARGE REPORT from ER Internal ED visit 03/15/2024   EEG N/A    EMG REPORT N/A         IMAGING  (FOR ALL VISITS)     MRI (HEAD, NECK, SPINE) Internal MRA brain/Neck- 03/15/2024   CT (HEAD, NECK, SPINE) Internal CT Head 03/15/2024

## 2024-05-02 NOTE — PROGRESS NOTES
INITIAL NEUROLOGY CONSULTATION    DATE OF VISIT: 5/3/2024  CLINIC LOCATION: [facility]  MRN: [id number]  PATIENT NAME: Ousmane Wheeler  YOB: 1960    REASON FOR VISIT: No chief complaint on file.    HISTORY OF PRESENT ILLNESS:                                                    Mr. Ousmane Wheeler is 63 year old right handed male patient with past medical history of megaloblastic anemia, hypertension, and leukemia, who was seen today for transient episode of left facial numbness.    Per patient's report, on 3/15/2024 the patient experienced left facial numbness while biting down/chewing on carrot that was also accompanied by his left upper arm numbness.  Symptoms lasted 5 to 10 minutes and then spontaneously resolved.  Was seen at [facility].    Head CT from 3/15/2024 was negative for acute intracranial pathology.  Brain MRI with and without contrast was negative for acute intracranial abnormality.  Head MRA demonstrated moderate short segment stenosis involving P2 segment of the right PCA.  Neck MRA was unremarkable.  Images were personally reviewed and independently interpreted.    The case was discussed over the phone with stroke neurology team.  The patient felt unlikely to have TIA, but optimization of stroke prevention measures was advised.  It was recommended to start aspirin and optimize hyperlipidemia.    Today, the patient reports ***.  He denies interval development of new focal neurological symptoms.    Laboratory evaluation from March 2024 demonstrated elevated creatinine of 1.2 on BMP, normal CBC, PTT and INR.    According to Care Everywhere, LDL was 143 in December 2023.  I do not see previous hemoglobin A1C.  PAST MEDICAL/SURGICAL HISTORY:                                                    I personally reviewed patient's past medical and surgical history with the patient at today's visit.  MEDICATIONS:                                                     I personally reviewed patient's medications and allergies with the patient at today's visit.  ALLERGIES:                                                    No Known Allergies  EXAM:                                                    VITAL SIGNS:   There were no vitals taken for this visit.  Mini-Cog Assessment:       General: pt is in NAD, cooperative.  Skin: normal turgor, moist mucous membranes, no lesions/rashes noticed.  HEENT: ATNC, EOMI, PERRL, white sclera, normal conjunctiva, no nystagmus or ptosis. No carotid bruits bilaterally.  Respiratory: lung sounds clear to auscultation bilaterally, no crackles, wheezes, rhonchi. Symmetric lung excursion, no accessory respiratory muscle use.  Cardiovascular: normal S1/S2, no murmurs/rubs/gallops.   Abdomen: Not distended.  : deferred.    Neurological:  Mental: alert, follows commands,  /5 with ***/3 on memory recall, no aphasia or dysarthria. Fund of knowledge is {MYAPPROPRIATE:734250}  Cranial Nerves:  CN II: visual acuity - able to accurately count fingers with each eye. Visual fields intact, fundi: discs sharp, no papilledema and normal vessels bilaterally.  CN III, IV, VI: EOM intact, pupils equal and reactive  CN V: facial sensation nl  CN VII: face symmetric, no facial droop  CN VIII: hearing normal  CN IX: palate elevation symmetric, uvula at midline  CN XI SCM normal, shoulder shrug nl  CN XII: tongue midline  Motor: Strength: 5/5 in all major groups of all extremities. Normal tone. No abnormal movements. No pronator drift b/l.  Reflexes: Triceps, biceps, brachioradialis, patellar, and achilles reflexes normal and symmetric. No clonus noted. Toes are down-going b/l.   Sensory: light touch, pinprick, and vibration intact. Romberg: negative.  Coordination: FNF and heel-shin tests intact b/l.   Gait:  Normal, able to tandem walk *** without difficulty.  DATA:   LABS/EEG/IMAGING/OTHER STUDIES: I reviewed pertinent medical records, as detailed in the  history of present illness.  ASSESSMENT AND PLAN:      ASSESSMENT: Ousmane Wheeler is a 63 year old male patient with listed above past medical history, who presents with ***.    We had a detailed discussion with the patient regarding his presenting complaints.  The neurological exam today is ***.    DIAGNOSES:  No diagnosis found.  PLAN: There are no Patient Instructions on file for this visit.    Total Time: *** minutes spent on the date of the encounter doing chart review, history and exam, documentation and further activities per the note.    Jordan Ortiz MD  [facility] Neurology  (Chart documentation was completed in part with Dragon voice-recognition software. Even though reviewed, some grammatical, spelling, and word errors may remain.)

## 2024-05-03 ENCOUNTER — OFFICE VISIT (OUTPATIENT)
Dept: NEUROLOGY | Facility: CLINIC | Age: 64
End: 2024-05-03
Attending: FAMILY MEDICINE
Payer: COMMERCIAL

## 2024-05-03 ENCOUNTER — PRE VISIT (OUTPATIENT)
Dept: NEUROLOGY | Facility: CLINIC | Age: 64
End: 2024-05-03

## 2024-05-03 VITALS — SYSTOLIC BLOOD PRESSURE: 134 MMHG | DIASTOLIC BLOOD PRESSURE: 83 MMHG | OXYGEN SATURATION: 98 % | HEART RATE: 58 BPM

## 2024-05-03 DIAGNOSIS — R29.818 TRANSIENT NEUROLOGICAL SYMPTOMS: Primary | ICD-10-CM

## 2024-05-03 PROCEDURE — G2211 COMPLEX E/M VISIT ADD ON: HCPCS | Performed by: PSYCHIATRY & NEUROLOGY

## 2024-05-03 PROCEDURE — 99205 OFFICE O/P NEW HI 60 MIN: CPT | Performed by: PSYCHIATRY & NEUROLOGY

## 2024-05-03 RX ORDER — ATORVASTATIN CALCIUM 10 MG/1
1 TABLET, FILM COATED ORAL DAILY
COMMUNITY
Start: 2024-03-19

## 2024-05-03 NOTE — LETTER
5/3/2024         RE: Ousmane Wheeler  [address] [facility] [id number]        Dear Colleague,    Thank you for referring your patient, Ousmane Wheeler, to the [facility]. Please see a copy of my visit note below.    INITIAL NEUROLOGY CONSULTATION    DATE OF VISIT: 5/3/2024  CLINIC LOCATION: [facility]  MRN: [id number]  PATIENT NAME: Ousmane Wheeler  YOB: 1960    REASON FOR VISIT:   Chief Complaint   Patient presents with     Consult     Left side Facial Numbness      HISTORY OF PRESENT ILLNESS:                                                    Mr. Ousmane Wheeler is 63 year old right handed male patient with past medical history of megaloblastic anemia, hypertension, and leukemia, who was seen today for transient episode of left facial/upper extremity numbness.    Per patient's report, on 3/15/2024 the patient experienced left facial numbness while biting down/chewing on carrot that was also accompanied by his left upper arm numbness.  Symptoms lasted 30 seconds and then spontaneously resolved.  Was seen at [facility].    Head CT from 3/15/2024 was negative for acute intracranial pathology.  Brain MRI with and without contrast was negative for acute intracranial abnormality.  Head MRA demonstrated moderate short segment stenosis involving P2 segment of the right PCA.  Neck MRA was unremarkable.  Images were personally reviewed and independently interpreted.    The case was discussed over the phone with stroke neurology team.  The patient felt unlikely to have TIA, but optimization of stroke prevention measures was advised.  It was recommended to start aspirin and optimize hyperlipidemia.    Today, the patient reports no recurrence of similar episodes.  He denies interval development of new focal neurological symptoms.    Laboratory evaluation from March 2024 demonstrated elevated creatinine of 1.2 on BMP,  normal CBC, PTT and INR.    According to Care Everywhere, LDL was 143 in December 2023.  I do not see previous hemoglobin A1C.  PAST MEDICAL/SURGICAL HISTORY:                                                    I personally reviewed patient's past medical and surgical history with the patient at today's visit.  MEDICATIONS:                                                    I personally reviewed patient's medications and allergies with the patient at today's visit.  ALLERGIES:                                                    No Known Allergies  EXAM:                                                    VITAL SIGNS:   /83 (BP Location: Left arm, Patient Position: Sitting, Cuff Size: Adult Regular)   Pulse 58   SpO2 98%   Mini-Cog Assessment:  Mini Cog Assessment  Clock Draw Score: 2 Normal  3 Item Recall: 3 objects recalled  Mini Cog Total Score: 5  Administered by: : Cat SOLORIO    General: pt is in NAD, cooperative.  Skin: normal turgor, moist mucous membranes, no lesions/rashes noticed.  HEENT: ATNC, EOMI, PERRL, white sclera, normal conjunctiva, no nystagmus or ptosis. No carotid bruits bilaterally.  Respiratory: lung sounds clear to auscultation bilaterally, no crackles, wheezes, rhonchi. Symmetric lung excursion, no accessory respiratory muscle use.  Cardiovascular: normal S1/S2, no murmurs/rubs/gallops.   Abdomen: Not distended.  : deferred.    Neurological:  Mental: alert, follows commands, Mini Cog Total Score: 5/5 with 3/3 on memory recall, no aphasia or dysarthria. Fund of knowledge is appropriate for age.  Cranial Nerves:  CN II: visual acuity - able to accurately count fingers with each eye. Visual fields intact, fundi: discs sharp, no papilledema and normal vessels bilaterally.  CN III, IV, VI: EOM intact, pupils equal and reactive  CN V: facial sensation nl  CN VII: face symmetric, no facial droop  CN VIII: hearing normal  CN IX: palate elevation symmetric, uvula at midline  CN XI SCM normal,  shoulder shrug nl  CN XII: tongue midline  Motor: Strength: 5/5 in all major groups of all extremities. Normal tone. No abnormal movements. No pronator drift b/l.  Reflexes: Triceps, biceps, brachioradialis, and patellar reflexes normal and symmetric, achilles reflexes are noticeably reduced bilaterally. No clonus noted. Toes are down-going b/l.   Sensory: light touch, pinprick, and vibration intact. Romberg: negative.  Coordination: FNF and heel-shin tests intact b/l.   Gait:  Normal, able to tandem walk without difficulty.  DATA:   LABS/EEG/IMAGING/OTHER STUDIES: I reviewed pertinent medical records, as detailed in the history of present illness.  ASSESSMENT AND PLAN:      ASSESSMENT: Ousmane Wheeler is a 63 year old male patient with listed above past medical history, who presents with transient episode of focal neurological symptoms (left facial and arm numbness) that lasted less than 1 minute without recurrence.    We had a detailed discussion with the patient regarding his presenting complaints.  The neurological exam today is non-focal (except bilaterally reduced achilles reflexes, likely age-related in absence of other abnormal findings).  We reviewed the images and went over the results of workup.      We discussed that his clinical presentation is not completely clear, but transient ischemic attack is not fully ruled out, especially in light of right P2 stenosis, which makes him more prone to have strokes in the future.  I suggested for the patient to restart aspirin and complete TIA workup, as detailed below.    DIAGNOSES:    ICD-10-CM    1. Transient neurological symptoms  R29.818 Adult Neurology  Referral     Adult Cardiac Event Monitor     Echocardiogram Complete     Hemoglobin A1c     Lipid panel reflex to direct LDL Fasting        PLAN: At today's visit we thoroughly discussed current symptoms, results of prior evaluation, symptoms and signs of stroke, stroke prevention measures, and the  "plan.    Symptoms and signs of stroke were discussed.  The patient was advised to call 911 with any SUDDEN onset of weakness, vision loss, speech problems, numbness, or severe headache of unknown cause.    For stroke prevention, I recommended to:  -Restart 81 mg aspirin daily  -Continue atorvastatin and recheck fasting lipid panel along with hemoglobin A1C  -Complete 30-day heart monitoring and echocardiogram  -Long-term blood pressure goal is less than 130/85  -Long-term LDL goal is 40-70  -Long-term goal of hemoglobin A1C is less than 7.0  -Low-salt low-fat diet  -Regular aerobic exercise 30 minutes 3-4 times per week    Next follow-up appointment is on as needed basis (based on results).    Total Time: 63 minutes spent on the date of the encounter doing chart review, history and exam, documentation and further activities per the note.    Jordan Ortiz MD  [facility] Neurology  (Chart documentation was completed in part with Dragon voice-recognition software. Even though reviewed, some grammatical, spelling, and word errors may remain.)            Ousmane Wheeler is a 63 year old male who presents for:  Chief Complaint   Patient presents with     Consult     Left side Facial Numbness         Initial Vitals:  /83 (BP Location: Left arm, Patient Position: Sitting, Cuff Size: Adult Regular)   Pulse 58   SpO2 98%  Estimated body mass index is 24.04 kg/m  as calculated from the following:    Height as of 3/15/24: 1.753 m (5' 9\").    Weight as of 3/15/24: 73.8 kg (162 lb 12.8 oz).. There is no height or weight on file to calculate BSA. BP completed using cuff size: regular    Cat Tobin       Again, thank you for allowing me to participate in the care of your patient.        Sincerely,        Jordan Ortiz MD  "

## 2024-05-03 NOTE — PROGRESS NOTES
"Ousmane Wheeler is a 63 year old male who presents for:  Chief Complaint   Patient presents with    Consult     Left side Facial Numbness         Initial Vitals:  /83 (BP Location: Left arm, Patient Position: Sitting, Cuff Size: Adult Regular)   Pulse 58   SpO2 98%  Estimated body mass index is 24.04 kg/m  as calculated from the following:    Height as of 3/15/24: 1.753 m (5' 9\").    Weight as of 3/15/24: 73.8 kg (162 lb 12.8 oz).. There is no height or weight on file to calculate BSA. BP completed using cuff size: regular    Cat Tobin   "

## 2024-05-03 NOTE — PROGRESS NOTES
INITIAL NEUROLOGY CONSULTATION    DATE OF VISIT: 5/3/2024  CLINIC LOCATION: [facility]  MRN: [id number]  PATIENT NAME: Ousmane Wheeler  YOB: 1960    REASON FOR VISIT:   Chief Complaint   Patient presents with    Consult     Left side Facial Numbness      HISTORY OF PRESENT ILLNESS:                                                    Mr. Ousmane Wheeler is 63 year old right handed male patient with past medical history of megaloblastic anemia, hypertension, and leukemia, who was seen today for transient episode of left facial/upper extremity numbness.    Per patient's report, on 3/15/2024 the patient experienced left facial numbness while biting down/chewing on carrot that was also accompanied by his left upper arm numbness.  Symptoms lasted 30 seconds and then spontaneously resolved.  Was seen at [facility].    Head CT from 3/15/2024 was negative for acute intracranial pathology.  Brain MRI with and without contrast was negative for acute intracranial abnormality.  Head MRA demonstrated moderate short segment stenosis involving P2 segment of the right PCA.  Neck MRA was unremarkable.  Images were personally reviewed and independently interpreted.    The case was discussed over the phone with stroke neurology team.  The patient felt unlikely to have TIA, but optimization of stroke prevention measures was advised.  It was recommended to start aspirin and optimize hyperlipidemia.    Today, the patient reports no recurrence of similar episodes.  He denies interval development of new focal neurological symptoms.    Laboratory evaluation from March 2024 demonstrated elevated creatinine of 1.2 on BMP, normal CBC, PTT and INR.    According to Care Everywhere, LDL was 143 in December 2023.  I do not see previous hemoglobin A1C.  PAST MEDICAL/SURGICAL HISTORY:                                                    I personally reviewed patient's past medical and surgical  history with the patient at today's visit.  MEDICATIONS:                                                    I personally reviewed patient's medications and allergies with the patient at today's visit.  ALLERGIES:                                                    No Known Allergies  EXAM:                                                    VITAL SIGNS:   /83 (BP Location: Left arm, Patient Position: Sitting, Cuff Size: Adult Regular)   Pulse 58   SpO2 98%   Mini-Cog Assessment:  Mini Cog Assessment  Clock Draw Score: 2 Normal  3 Item Recall: 3 objects recalled  Mini Cog Total Score: 5  Administered by: : Cat SOLORIO    General: pt is in NAD, cooperative.  Skin: normal turgor, moist mucous membranes, no lesions/rashes noticed.  HEENT: ATNC, EOMI, PERRL, white sclera, normal conjunctiva, no nystagmus or ptosis. No carotid bruits bilaterally.  Respiratory: lung sounds clear to auscultation bilaterally, no crackles, wheezes, rhonchi. Symmetric lung excursion, no accessory respiratory muscle use.  Cardiovascular: normal S1/S2, no murmurs/rubs/gallops.   Abdomen: Not distended.  : deferred.    Neurological:  Mental: alert, follows commands, Mini Cog Total Score: 5/5 with 3/3 on memory recall, no aphasia or dysarthria. Fund of knowledge is appropriate for age.  Cranial Nerves:  CN II: visual acuity - able to accurately count fingers with each eye. Visual fields intact, fundi: discs sharp, no papilledema and normal vessels bilaterally.  CN III, IV, VI: EOM intact, pupils equal and reactive  CN V: facial sensation nl  CN VII: face symmetric, no facial droop  CN VIII: hearing normal  CN IX: palate elevation symmetric, uvula at midline  CN XI SCM normal, shoulder shrug nl  CN XII: tongue midline  Motor: Strength: 5/5 in all major groups of all extremities. Normal tone. No abnormal movements. No pronator drift b/l.  Reflexes: Triceps, biceps, brachioradialis, and patellar reflexes normal and symmetric, achilles reflexes  are noticeably reduced bilaterally. No clonus noted. Toes are down-going b/l.   Sensory: light touch, pinprick, and vibration intact. Romberg: negative.  Coordination: FNF and heel-shin tests intact b/l.   Gait:  Normal, able to tandem walk without difficulty.  DATA:   LABS/EEG/IMAGING/OTHER STUDIES: I reviewed pertinent medical records, as detailed in the history of present illness.  ASSESSMENT AND PLAN:      ASSESSMENT: Ousmane Wheeler is a 63 year old male patient with listed above past medical history, who presents with transient episode of focal neurological symptoms (left facial and arm numbness) that lasted less than 1 minute without recurrence.    We had a detailed discussion with the patient regarding his presenting complaints.  The neurological exam today is non-focal (except bilaterally reduced achilles reflexes, likely age-related in absence of other abnormal findings).  We reviewed the images and went over the results of workup.      We discussed that his clinical presentation is not completely clear, but transient ischemic attack is not fully ruled out, especially in light of right P2 stenosis, which makes him more prone to have strokes in the future.  I suggested for the patient to restart aspirin and complete TIA workup, as detailed below.    DIAGNOSES:    ICD-10-CM    1. Transient neurological symptoms  R29.818 Adult Neurology  Referral     Adult Cardiac Event Monitor     Echocardiogram Complete     Hemoglobin A1c     Lipid panel reflex to direct LDL Fasting        PLAN: At today's visit we thoroughly discussed current symptoms, results of prior evaluation, symptoms and signs of stroke, stroke prevention measures, and the plan.    Symptoms and signs of stroke were discussed.  The patient was advised to call 911 with any SUDDEN onset of weakness, vision loss, speech problems, numbness, or severe headache of unknown cause.    For stroke prevention, I recommended to:  -Restart 81 mg aspirin  daily  -Continue atorvastatin and recheck fasting lipid panel along with hemoglobin A1C  -Complete 30-day heart monitoring and echocardiogram  -Long-term blood pressure goal is less than 130/85  -Long-term LDL goal is 40-70  -Long-term goal of hemoglobin A1C is less than 7.0  -Low-salt low-fat diet  -Regular aerobic exercise 30 minutes 3-4 times per week    Next follow-up appointment is on as needed basis (based on results).    Total Time: 63 minutes spent on the date of the encounter doing chart review, history and exam, documentation and further activities per the note.    Jordan Ortiz MD  [facility] Neurology  (Chart documentation was completed in part with Dragon voice-recognition software. Even though reviewed, some grammatical, spelling, and word errors may remain.)

## 2024-05-03 NOTE — PATIENT INSTRUCTIONS
AFTER VISIT SUMMARY (AVS):    At today's visit we thoroughly discussed current symptoms, results of prior evaluation, symptoms and signs of stroke, stroke prevention measures, and the plan.    Symptoms and signs of stroke were discussed.  You should call 911 with any SUDDEN onset of weakness, vision loss, speech problems, numbness, or severe headache of unknown cause.    For stroke prevention, you should:  -Restart 81 mg aspirin daily  -Continue atorvastatin and recheck fasting lipid panel along with hemoglobin A1C  -Complete 30-day heart monitoring and echocardiogram  -Long-term blood pressure goal is less than 130/85  -Long-term LDL goal is 40-70  -Long-term goal of hemoglobin A1C is less than 7.0  -Low-salt low-fat diet  -Regular aerobic exercise 30 minutes 3-4 times per week    Next follow-up appointment is on as needed basis (based on results).    Please do not hesitate to call me with any questions or concerns.    Thanks.

## 2024-05-17 ENCOUNTER — HOSPITAL ENCOUNTER (OUTPATIENT)
Dept: CARDIOLOGY | Facility: CLINIC | Age: 64
Discharge: HOME | End: 2024-05-17
Attending: PSYCHIATRY & NEUROLOGY
Payer: COMMERCIAL

## 2024-05-17 DIAGNOSIS — R29.818 TRANSIENT NEUROLOGICAL SYMPTOMS: ICD-10-CM

## 2024-05-17 PROCEDURE — 93272 ECG/REVIEW INTERPRET ONLY: CPT | Performed by: INTERNAL MEDICINE

## 2024-05-31 ENCOUNTER — HOSPITAL ENCOUNTER (OUTPATIENT)
Dept: CARDIOLOGY | Facility: CLINIC | Age: 64
Discharge: HOME | End: 2024-05-31
Attending: PSYCHIATRY & NEUROLOGY | Admitting: PSYCHIATRY & NEUROLOGY
Payer: COMMERCIAL

## 2024-05-31 ENCOUNTER — LAB (OUTPATIENT)
Dept: LAB | Facility: CLINIC | Age: 64
End: 2024-05-31
Payer: COMMERCIAL

## 2024-05-31 DIAGNOSIS — R29.818 TRANSIENT NEUROLOGICAL SYMPTOMS: ICD-10-CM

## 2024-05-31 LAB
HBA1C MFR BLD: 5.2 % (ref 0–5.6)
LVEF ECHO: NORMAL

## 2024-05-31 PROCEDURE — 36415 COLL VENOUS BLD VENIPUNCTURE: CPT

## 2024-05-31 PROCEDURE — 999N000208 ECHOCARDIOGRAM COMPLETE

## 2024-05-31 PROCEDURE — 93306 TTE W/DOPPLER COMPLETE: CPT | Mod: 26 | Performed by: INTERNAL MEDICINE

## 2024-05-31 PROCEDURE — 83036 HEMOGLOBIN GLYCOSYLATED A1C: CPT

## 2024-07-12 ENCOUNTER — TELEPHONE (OUTPATIENT)
Dept: NEUROLOGY | Facility: CLINIC | Age: 64
End: 2024-07-12
Payer: COMMERCIAL

## 2024-07-12 NOTE — TELEPHONE ENCOUNTER
----- Message from Jordan Ortiz sent at 7/12/2024  9:33 AM CDT -----  Please advise the patient that cardiac monitoring was unrevealing.  No changes to plan.  Thanks,  Jordan Ortiz MD.